# Patient Record
Sex: FEMALE
[De-identification: names, ages, dates, MRNs, and addresses within clinical notes are randomized per-mention and may not be internally consistent; named-entity substitution may affect disease eponyms.]

---

## 2018-08-24 ENCOUNTER — HOSPITAL ENCOUNTER (OUTPATIENT)
Dept: HOSPITAL 92 - BICULT | Age: 23
Discharge: HOME | End: 2018-08-24
Attending: OBSTETRICS & GYNECOLOGY
Payer: COMMERCIAL

## 2018-08-24 DIAGNOSIS — Z34.82: Primary | ICD-10-CM

## 2018-08-24 PROCEDURE — 76805 OB US >/= 14 WKS SNGL FETUS: CPT

## 2018-11-12 ENCOUNTER — HOSPITAL ENCOUNTER (OUTPATIENT)
Dept: HOSPITAL 92 - L&D/OP | Age: 23
End: 2018-11-12
Attending: OBSTETRICS & GYNECOLOGY
Payer: COMMERCIAL

## 2018-11-12 VITALS — BODY MASS INDEX: 27.4 KG/M2

## 2018-11-12 VITALS — DIASTOLIC BLOOD PRESSURE: 65 MMHG | SYSTOLIC BLOOD PRESSURE: 116 MMHG | TEMPERATURE: 98 F

## 2018-11-12 DIAGNOSIS — Z3A.32: ICD-10-CM

## 2018-11-12 DIAGNOSIS — N89.8: ICD-10-CM

## 2018-11-12 DIAGNOSIS — Z79.899: ICD-10-CM

## 2018-11-12 DIAGNOSIS — O99.613: ICD-10-CM

## 2018-11-12 DIAGNOSIS — Z79.2: ICD-10-CM

## 2018-11-12 DIAGNOSIS — R31.9: ICD-10-CM

## 2018-11-12 DIAGNOSIS — Z88.0: ICD-10-CM

## 2018-11-12 DIAGNOSIS — O99.89: Primary | ICD-10-CM

## 2018-11-12 DIAGNOSIS — K04.7: ICD-10-CM

## 2018-11-12 DIAGNOSIS — N13.30: ICD-10-CM

## 2018-11-12 LAB
CRYSTAL-AUWI FLAG: 1.7 (ref 0–15)
GLUCOSE UR STRIP-MCNC: 100 MG/DL
HEV IGM SER QL: 4.2 (ref 0–7.99)
HYALINE CASTS #/AREA URNS LPF: (no result) LPF
PATHC CAST-AUWI FLAG: 0.58 (ref 0–2.49)
SP GR UR STRIP: 1.01 (ref 1–1.04)
SPERM-AUWI FLAG: 0 (ref 0–9.9)
WBC UR QL AUTO: (no result) HPF (ref 0–3)
YEAST-AUWI FLAG: 0 (ref 0–25)

## 2018-11-12 PROCEDURE — 81001 URINALYSIS AUTO W/SCOPE: CPT

## 2018-11-12 PROCEDURE — 87480 CANDIDA DNA DIR PROBE: CPT

## 2018-11-12 PROCEDURE — 96375 TX/PRO/DX INJ NEW DRUG ADDON: CPT

## 2018-11-12 PROCEDURE — 87086 URINE CULTURE/COLONY COUNT: CPT

## 2018-11-12 PROCEDURE — 87510 GARDNER VAG DNA DIR PROBE: CPT

## 2018-11-12 PROCEDURE — 96360 HYDRATION IV INFUSION INIT: CPT

## 2018-11-12 PROCEDURE — 76770 US EXAM ABDO BACK WALL COMP: CPT

## 2018-11-12 PROCEDURE — 51701 INSERT BLADDER CATHETER: CPT

## 2018-11-12 PROCEDURE — 99283 EMERGENCY DEPT VISIT LOW MDM: CPT

## 2018-11-12 PROCEDURE — 87660 TRICHOMONAS VAGIN DIR PROBE: CPT

## 2018-11-13 NOTE — PDOC.LDHP
Labor and Delivery H&P


Chief complaint: other (R. side back pain/painful urination)


HPI: 





22 yo F  @ 32.1 weeks presents to L&D with c/c of painful urination and R. 

sided back pain. Pt reports today she started experiencing R. sided back pain 

that would radiate to right side of her abdomen. She also reports having 

burning sensation when peeing today. Reports increased urinary frequency. 

Reports her abdomen tightening at times. Pt reports irritating vaginal 

discharge yesterday. Denies any fever/chills. Denies any SOB/chest pain. Denies 

any headaches, dizziness, lightheadness. Denies any swelling. 


Pt reports having uti 1 month ago tx with 7 day course of nitrofurantonin. She 

said it didn't really resolve so they gave her a 1x abx injection. She states 

it got a little better but has never improved fully this last month. 


Reports having tooth infection for last month which she has been taking 500mg 

amoxicllin. She also had leftover tylenol #3 which she took 1 today for pain 

and states did not help much with pain. 


+FM, denies ctx, denies LOF, denies any bleeding. 





Current gestational age (weeks): 32 (1)


Grav: 2


Para: 1


Current pregnancy complications: other (Pt to get growth U/S in a week for 

concern of IUGR)


Abnormal US findings: No


Past Medical History: 





N/A


Current medications: pre-yumi vitamins, other (Amoxcillin)


Social history: none





- Physical Exam


Vital signs reviewed and normal: yes


General: NAD, resting, breathing through contractions


Heart: RRR


Lungs: nonlabored breathing


Abdomen: other (R. sided CVA tenderness noted. Abdomen NTTP. No masses noted. 

Gravid.)


Extremeties: no edema


FHT: category 1, variability present


[place] contractions every: None seen





- OB Labs


Blood type: unknown


Antibody Screen: unknown


HIV: unknown


RPR: unknown


HEPSAg: unknown


GBS: unknown





- Assessment





Possible UTI vs Round Ligament Pain 





- Plan


Plan: observation in L&D


-: 





22 yo  here for burning urination/R. side rib pain 


-UTI and VP3 ordered. VS stable. No sign of fever. 


-Fetal fibronectin ordered. 


-FHT cat 1 strip. . Will continue to monitor. 


-Will await results and tx accordingly. 





<Albert Negro - Last Filed: 18 00:06>





<Patito Irving - Last Filed: 18 00:24>


Allergies/Adverse Reactions: 


 Allergies











Allergy/AdvReac Type Severity Reaction Status Date / Time


 


Penicillins Allergy Mild Rash Verified 18 23:08














Attending Addendum





- Attending Addendum


Date/Time: 18 0023





I personally evaluated the patient and discussed the management with Dr. Negro


I agree with the History, Examination, Assessment and Plan documented above 

with any addition or exceptions noted below.





UA with +blood but otherwise unremarkable for UTI.  Renal ultrasound for kidney 

stone ordered.  Patient advised to increase fluid intake.  








<Patito Irving - Last Filed: 18 00:24>

## 2018-11-13 NOTE — PDOC.EVN
Event Note





- Event Note


Event Note: 





24 yo  @ 32.1 weeks presents w/ R. side R. pain. 


-U/A has no sign of infection. Large amounts of blood noted. Pt still having 

pain in R. side. Will order Renal U/S to assess for possible stone. 


-VP3 will not result til morning. 


-Tylenol PRN for pain. 


-FHT Cat 1 strip. continue to monitor. 





<Albert Negro - Last Filed: 18 00:17>





Attending Addendum





- Attending Addendum


Date/Time: 18 0103





I personally evaluated the patient and discussed the management with Dr. Negro.


I agree with the History, Examination, Assessment and Plan documented above 

with any addition or exceptions noted below.





Renal ultrasound done but results pending.  Will give IV fluids and pain 

medication at this time.  








<Patito Irving - Last Filed: 18 01:04>

## 2018-11-13 NOTE — PDOC.EVN
Event Note





- Event Note


Event Note: 





Ultrasound showed possible 5x7mm stone at the VUJ.  After receiving iv fluids 

and pain medication, patient feeling much better and pain resolved.  Would like 

to go home. No fever or e/o infection at this time.  Will d/c home with 

precautions.  Advised to call clinic to schedule follow up appointment.  Return 

if symptoms return.

## 2018-11-13 NOTE — ULT
PRELIMINARY REPORT/[facility]/EMERGENTY AFTER-HOURS PROCEDURE 

 

US Retroperitoneal Complete

 

EXAM DATE/TIME:

11/13/2018 12:35 AM

 

CLINICAL HISTORY:

23 years old, female; Abdominal pain; Right flank pain, blood in urine; 32 wks pregnant

 

TECHNIQUE:

Real-time ultrasound of the retroperitoneum with image documentation. Complete exam.

 

COMPARISON:

No relevant prior studies available.

 

FINDINGS:

The right kidney measures 13.3 cm longitudinally.

The left kidney measures 11.4 cm longitudinally.

Normal renal cortical echogenicity.

Moderate right hydronephrosis.

Possible 7 x 5 mm calcified stone in the region of the right UVJ.

Minimal left hydronephrosis.

No calcified renal stones.

No perinephric fluid collections.

Prevoid bladder via mist 225 mL.

Left ureteral jet is visualized.

 

IMPRESSION:

1. Moderate right hydronephrosis. Possible 7 x 5 mm calcified stone in the region of the right UVJ.

2. Minimal left hydronephrosis.

 

Thank you for allowing us to participate in the care of your patient.

Dictated and Authenticated by: Leighton Martinez MD

11/13/2018 1:25 AM Central Time ([place] & [place])

 

 

FINAL REPORT

BILATERAL RENAL ULTRASOUND:

 

History: Hematuria. Evaluate for hydronephrosis. 

 

Comparison: None. 

 

FINDINGS:

This report is in agreement with preliminary report by JO ANN. There is moderate right sided hydronephr
osis. Mild left sided hydronephrosis. Questionable calculus in the right ureterovesicular junction me
asuring 0.7 cm. Left ureteral jet is noted. 

 

 

 

POS: [facility]

## 2018-11-21 ENCOUNTER — HOSPITAL ENCOUNTER (OUTPATIENT)
Dept: HOSPITAL 92 - BICULT | Age: 23
Discharge: HOME | End: 2018-11-21
Attending: OBSTETRICS & GYNECOLOGY
Payer: COMMERCIAL

## 2018-11-21 DIAGNOSIS — Z3A.34: ICD-10-CM

## 2018-11-21 DIAGNOSIS — Z34.83: Primary | ICD-10-CM

## 2018-11-21 PROCEDURE — 76805 OB US >/= 14 WKS SNGL FETUS: CPT

## 2018-11-21 NOTE — ULT
COMPLETE OB ULTRASOUND GREATER THAN 14 WEEKS:

 

HISTORY: 

Size and dates.

 

FINDINGS: 

A single viable intrauterine fetus is noted in cephalic presentation.  Placenta is fundal.  Fetal hea
rt rate 133 b.p.m.  Lower uterine segment was less than optimally seen.  Amniotic fluid index 13.4 cm
.

 

Visualized fetal brain, 4-chamber heart, stomach, kidneys, cord insert, bladder, spine, lips and nose
, and 3-vessel cord were demonstrated and appear within normal limits.  

 

FETAL BIOMETRY:

BPD                      8.3 cm-33 weeks 2 days

Head circumference      32.2 cm-36 weeks 6 days

Abdominal circumference 29.1 cm-33 weeks 1 day

Femur length             6.7 cm-34 weeks 3 days

 

IMPRESSION: 

1.  Gestational age average 34 weeks 3 days.  Estimated date of delivery 12/30/2018.

 

2.  Estimated fetal weight 2281 grams.

 

POS: [facility]

## 2019-01-03 ENCOUNTER — HOSPITAL ENCOUNTER (INPATIENT)
Dept: HOSPITAL 92 - L&D/OP | Age: 24
LOS: 2 days | Discharge: HOME | End: 2019-01-05
Attending: OBSTETRICS & GYNECOLOGY | Admitting: OBSTETRICS & GYNECOLOGY
Payer: COMMERCIAL

## 2019-01-03 VITALS — BODY MASS INDEX: 27.1 KG/M2

## 2019-01-03 DIAGNOSIS — Z3A.39: ICD-10-CM

## 2019-01-03 LAB
HBSAG INDEX: 0.24 S/CO (ref 0–0.99)
HGB BLD-MCNC: 14.6 G/DL (ref 12–16)
MCH RBC QN AUTO: 32.4 PG (ref 27–31)
MCV RBC AUTO: 91.9 FL (ref 78–98)
PLATELET # BLD AUTO: 209 THOU/UL (ref 130–400)
RBC # BLD AUTO: 4.49 MILL/UL (ref 4.2–5.4)
SYPHILIS ANTIBODY INDEX: 0.04 S/CO
WBC # BLD AUTO: 10.4 THOU/UL (ref 4.8–10.8)

## 2019-01-03 PROCEDURE — 0KQM0ZZ REPAIR PERINEUM MUSCLE, OPEN APPROACH: ICD-10-PCS | Performed by: OBSTETRICS & GYNECOLOGY

## 2019-01-03 PROCEDURE — 72170 X-RAY EXAM OF PELVIS: CPT

## 2019-01-03 PROCEDURE — 36415 COLL VENOUS BLD VENIPUNCTURE: CPT

## 2019-01-03 PROCEDURE — 10907ZC DRAINAGE OF AMNIOTIC FLUID, THERAPEUTIC FROM PRODUCTS OF CONCEPTION, VIA NATURAL OR ARTIFICIAL OPENING: ICD-10-PCS | Performed by: OBSTETRICS & GYNECOLOGY

## 2019-01-03 PROCEDURE — 87340 HEPATITIS B SURFACE AG IA: CPT

## 2019-01-03 PROCEDURE — 86780 TREPONEMA PALLIDUM: CPT

## 2019-01-03 PROCEDURE — 85027 COMPLETE CBC AUTOMATED: CPT

## 2019-01-03 PROCEDURE — 86901 BLOOD TYPING SEROLOGIC RH(D): CPT

## 2019-01-03 PROCEDURE — 86900 BLOOD TYPING SEROLOGIC ABO: CPT

## 2019-01-03 PROCEDURE — 86850 RBC ANTIBODY SCREEN: CPT

## 2019-01-03 PROCEDURE — 99285 EMERGENCY DEPT VISIT HI MDM: CPT

## 2019-01-03 RX ADMIN — HYDROCODONE BITARTRATE AND ACETAMINOPHEN PRN TAB: 5; 325 TABLET ORAL at 18:35

## 2019-01-03 RX ADMIN — DOCUSATE CALCIUM SCH MG: 240 CAPSULE, LIQUID FILLED ORAL at 21:26

## 2019-01-03 RX ADMIN — HYDROCODONE BITARTRATE AND ACETAMINOPHEN PRN TAB: 5; 325 TABLET ORAL at 14:12

## 2019-01-03 RX ADMIN — Medication PRN MLS: at 09:07

## 2019-01-03 RX ADMIN — Medication PRN MLS: at 10:21

## 2019-01-03 NOTE — PDOC.LDHP
Labor and Delivery H&P


Chief complaint: contractions


HPI: 


LATE ENTRY H&P





22 yo  @ 39w4d admit for active labor, 8 cm on initial evaluation 


Current gestational age (weeks): 39


Due date: 19


Dating criteria: last menstrual period


Grav: 2


Para: 1


OB History Details: 


1 term 


Current pregnancy complications: none


Abnormal US findings: No


Past Medical History: 


Denies


Current medications: pre- vitamins


Previous surgical history: none


Allergies/Adverse Reactions: 


 Allergies











Allergy/AdvReac Type Severity Reaction Status Date / Time


 


Penicillins Allergy Mild Rash Verified 19 05:06











Social history: none





- Physical Exam


Vital signs reviewed and normal: yes


General: NAD


Heart: RRR


Lungs: nonlabored breathing


Abdomen: gravid


Extremeties: no edema


FHT: category 1


[place] contractions every: q2-3 min





- Vaginal Exam


cm dilated: 9 (cephalic, AROM clear fluid on my initial exam )


Effacement: 100%


Station: 0





- OB Labs


Blood type: O


RH: positive


Antibody Screen: negative


HIV: negative


RPR: negative


HEPSAg: negative


1 hour GCT: negative


GBS: positive


Urine drug screen: negative


Additional Labs: 


AFP negative 





- Assessment


L&D Assessment: term patient in labor





- Plan


Plan: admit to L&D, GBS antibiotic prophylaxis, informed consent obtained, 

anesthesia consult for pain management

## 2019-01-03 NOTE — PDOC.OPDEL
OB Operative/Delivery Note


Delivery Dr/Surgeon: Darlene Montoya DO 


Pre-Delivery Diagnosis: active labor


Procedure/Post Delivery Dx: spontaneous vaginal delivery


Weeks gestation: 39


Anesthesia: local





- Findings


  ** A


Sex: male


Apgar - 1 min: 9


Apgar - 5 min: 9





- Additional Findings/Plan


Placenta delivered: spontaneous


Repaired Obstetrical Laceration: 2nd degree (Deep 2nd degree, could visualize 

annal sphincter, but not disrupted.)


Estimated blood loss:  cc


Compilations/Other Findings: 


Infant in cephalic presentation


Clear amniotic fluid


Normal appearing placenta and cord 


Deep 2nd degree laceration repaired, able to see sphincter, however, not 

disrupted. Deep layers reapproximated with interrupted and remainder repaired 

in typical fashion. 


Post delivery plan: routine recovery

## 2019-01-04 LAB
HGB BLD-MCNC: 12 G/DL (ref 12–16)
MCH RBC QN AUTO: 32.5 PG (ref 27–31)
MCV RBC AUTO: 93.8 FL (ref 78–98)
PLATELET # BLD AUTO: 191 THOU/UL (ref 130–400)
RBC # BLD AUTO: 3.67 MILL/UL (ref 4.2–5.4)
WBC # BLD AUTO: 12 THOU/UL (ref 4.8–10.8)

## 2019-01-04 RX ADMIN — HYDROCODONE BITARTRATE AND ACETAMINOPHEN PRN TAB: 5; 325 TABLET ORAL at 06:33

## 2019-01-04 RX ADMIN — HYDROCODONE BITARTRATE AND ACETAMINOPHEN PRN TAB: 5; 325 TABLET ORAL at 21:20

## 2019-01-04 RX ADMIN — DOCUSATE CALCIUM SCH MG: 240 CAPSULE, LIQUID FILLED ORAL at 21:20

## 2019-01-04 RX ADMIN — DOCUSATE CALCIUM SCH MG: 240 CAPSULE, LIQUID FILLED ORAL at 09:00

## 2019-01-04 NOTE — PDOC.PP
Post Partum Progress Note


Post Partum Day #: 1


Subjective: 


Minimal bleeding. Breast and formula feeding. Pt does c/o tenderness at pubic 

symphysis and some in her right hip. She is able to walk unassisted and denies 

any numbness of tingling. No other concerns. 


PO intake tolerated: yes


Flatus: yes


Ambulation: yes


 Vital Signs (12 hours)











  Temp Pulse Resp BP Pulse Ox


 


 19 08:00  97.9 F  59 L  20  100/58 L  97


 


 19 06:30   62   93/51 L 


 


 19 04:25  98.1 F  62  18  92/54 L  97


 


 19 00:00  98.4 F  63  18  98/53 L  98


 


 19 20:30  97.7 F  62  16  97/55 L  97








 Weight











Weight                         163 lb

















- Physical Examination


General: NAD


Cardiovascular: RRR


Respiratory: non-labored breathing


Abdominal: no distention, appropriately TTP


Fundus firm & at: below umbilius 


Extremities: negative homans (B)


Deviation from normal: Mild-mod ttp at pubic symphysis; passive and active ROM 

LEs wnl


Perineum: decreased swelling


Neurological: no gross focal deficits


Psychiatric: A&Ox3, normal affect


Result Diagrams: 


 19 06:09





Additional Labs: 


 Post Partum Labs











Blood Type  O POSITIVE   19  05:23    


 


Hep Bs Antigen  Non-Reactive S/CO (NonReactive)   19  05:23    











(1)  (spontaneous vaginal delivery)


Code(s): O80 - ENCOUNTER FOR FULL-TERM UNCOMPLICATED DELIVERY   Status: Acute   





- Assessment/Plan


PPD 1


VSSAF


Pelvic XR done, mild pelvic symphysis diastasis 11 mm. Reviewed with ortho and 

no need for binder at this time as she is able to ambulate and mild. Advised to 

take PRN pain meds and should improve post partum.  


Plan for d/c home today with PRN pain meds sent to pharmacy.

## 2019-01-04 NOTE — RAD
AP PELVIS:

 

Date:  01/04/19 

 

HISTORY:  

Evaluation for pubic symphysis diastasis. 

 

FINDINGS:

There is mild widening to the symphysis, which measures approximately 11.0 mm. SI joints appear symme
tric. I do not appreciate any fractures. 

 

IMPRESSION: 

Mild diastasis of the symphysis. 

 

 

POS: [facility]

## 2019-01-05 VITALS — DIASTOLIC BLOOD PRESSURE: 56 MMHG | SYSTOLIC BLOOD PRESSURE: 104 MMHG | TEMPERATURE: 98.1 F

## 2019-01-05 RX ADMIN — DOCUSATE CALCIUM SCH MG: 240 CAPSULE, LIQUID FILLED ORAL at 09:36

## 2019-01-05 NOTE — PDOC.PP
Post Partum Progress Note


Post Partum Day #: 2


Subjective: 





Abdominal discomfort now better; desires DC to home


PO intake tolerated: yes


Flatus: yes


Ambulation: yes


 Vital Signs (12 hours)











  Temp Pulse Resp BP Pulse Ox


 


 19 20:15  97.7 F  78  16  96/56 L  98








 Weight











Weight                         163 lb

















- Physical Examination


General: NAD


Cardiovascular: no m/r/g


Respiratory: clear to auscultation bilaterally


Abdominal: + bowel sounds, lochia, no distention


Extremities: negative homans (B)


Neurological: no gross focal deficits


Psychiatric: A&Ox3, normal affect


Result Diagrams: 


 19 06:09





Additional Labs: 


 Post Partum Labs











Blood Type  O POSITIVE   19  05:23    


 


Hep Bs Antigen  Non-Reactive S/CO (NonReactive)   19  05:23    











(1)  (spontaneous vaginal delivery)


Code(s): O80 - ENCOUNTER FOR FULL-TERM UNCOMPLICATED DELIVERY   Status: Acute   





- Assessment/Plan





PPD 2 from  1/3...doing well.





OK for DC to home today.


F/U 4 weeks postpartum